# Patient Record
Sex: MALE | URBAN - METROPOLITAN AREA
[De-identification: names, ages, dates, MRNs, and addresses within clinical notes are randomized per-mention and may not be internally consistent; named-entity substitution may affect disease eponyms.]

---

## 2022-03-17 ENCOUNTER — SEE NOTE (OUTPATIENT)
Dept: URBAN - METROPOLITAN AREA CLINIC 36 | Facility: CLINIC | Age: 32
Setting detail: DERMATOLOGY
End: 2022-03-17

## 2022-03-17 ENCOUNTER — RX ONLY (RX ONLY)
Age: 32
End: 2022-03-17

## 2022-03-17 DIAGNOSIS — D22.5 MELANOCYTIC NEVI OF TRUNK: ICD-10-CM

## 2022-03-17 PROCEDURE — 99204 OFFICE O/P NEW MOD 45 MIN: CPT

## 2022-03-17 RX ORDER — FLUCONAZOLE 150 MG/1
1 TABLET TABLET ORAL
Qty: 2 | Refills: 0
Start: 2022-03-17

## 2022-03-17 RX ORDER — KETOCONAZOLE 20 MG/ML
SHAMPOO, SUSPENSION TOPICAL
Qty: 120 | Refills: 5
Start: 2022-03-17

## 2022-04-19 ENCOUNTER — SKIN CANCER EXAM (OUTPATIENT)
Dept: URBAN - METROPOLITAN AREA CLINIC 36 | Facility: CLINIC | Age: 32
Setting detail: DERMATOLOGY
End: 2022-04-19

## 2022-04-19 PROBLEM — D18.01 HEMANGIOMA OF SKIN AND SUBCUTANEOUS TISSUE: Status: ACTIVE | Noted: 2022-04-19

## 2022-04-19 PROBLEM — D18.01 HEMANGIOMA OF SKIN AND SUBCUTANEOUS TISSUE: Status: RESOLVED | Noted: 2022-04-19

## 2022-04-19 PROCEDURE — 99214 OFFICE O/P EST MOD 30 MIN: CPT

## 2024-03-27 ENCOUNTER — APPOINTMENT (OUTPATIENT)
Dept: URBAN - METROPOLITAN AREA CLINIC 206 | Age: 34
Setting detail: DERMATOLOGY
End: 2024-03-29

## 2024-03-27 DIAGNOSIS — L20.89 OTHER ATOPIC DERMATITIS: ICD-10-CM

## 2024-03-27 DIAGNOSIS — L21.8 OTHER SEBORRHEIC DERMATITIS: ICD-10-CM

## 2024-03-27 DIAGNOSIS — H01.13 ECZEMATOUS DERMATITIS OF EYELID: ICD-10-CM

## 2024-03-27 PROBLEM — H01.132 ECZEMATOUS DERMATITIS OF RIGHT LOWER EYELID: Status: ACTIVE | Noted: 2024-03-27

## 2024-03-27 PROCEDURE — OTHER PRESCRIPTION: OTHER

## 2024-03-27 PROCEDURE — OTHER PRESCRIPTION MEDICATION MANAGEMENT: OTHER

## 2024-03-27 PROCEDURE — 99214 OFFICE O/P EST MOD 30 MIN: CPT

## 2024-03-27 PROCEDURE — OTHER COUNSELING: OTHER

## 2024-03-27 PROCEDURE — OTHER PATIENT SPECIFIC COUNSELING: OTHER

## 2024-03-27 PROCEDURE — OTHER MIPS QUALITY: OTHER

## 2024-03-27 RX ORDER — PIMECROLIMUS 10 MG/G
CREAM TOPICAL
Qty: 30 | Refills: 2 | Status: ERX | COMMUNITY
Start: 2024-03-27

## 2024-03-27 RX ORDER — MOMETASONE FUROATE 1 MG/G
CREAM TOPICAL
Qty: 45 | Refills: 2 | Status: ERX | COMMUNITY
Start: 2024-03-27

## 2024-03-27 RX ORDER — CICLOPIROX 10 MG/.96ML
SHAMPOO TOPICAL
Qty: 120 | Refills: 6 | Status: ERX | COMMUNITY
Start: 2024-03-27

## 2024-03-27 ASSESSMENT — BSA RASH: BSA RASH: 2

## 2024-03-27 ASSESSMENT — LOCATION ZONE DERM
LOCATION ZONE: SCALP
LOCATION ZONE: TRUNK
LOCATION ZONE: EYELID

## 2024-03-27 ASSESSMENT — SEVERITY ASSESSMENT 2020: SEVERITY 2020: MODERATE

## 2024-03-27 ASSESSMENT — LOCATION SIMPLE DESCRIPTION DERM
LOCATION SIMPLE: LEFT SCALP
LOCATION SIMPLE: RIGHT INFERIOR EYELID
LOCATION SIMPLE: LOWER BACK

## 2024-03-27 ASSESSMENT — LOCATION DETAILED DESCRIPTION DERM
LOCATION DETAILED: RIGHT LATERAL INFERIOR EYELID
LOCATION DETAILED: LEFT MEDIAL FRONTAL SCALP
LOCATION DETAILED: INFERIOR LUMBAR SPINE

## 2024-03-27 ASSESSMENT — ITCH NUMERIC RATING SCALE: HOW SEVERE IS YOUR ITCHING?: 5

## 2024-03-27 NOTE — PROCEDURE: PRESCRIPTION MEDICATION MANAGEMENT
Render In Strict Bullet Format?: Yes
Initiate Treatment: Elidel 1 % topical cream - Apply to eyelids twice a day for two weeks
Detail Level: Zone
Initiate Treatment: ciclopirox 1 % shampoo - Lather on scalp at least twice a week, let sit for 3-5 minutes, then rinse. Use 2-3 times a week as needed for maintenance.
Plan: RTC in 4 weeks
Initiate Treatment: mometasone 0.1 % topical cream - Apply to eczema on body twice a day x 2 weeks.  Do not use on face, axillae, or groin.\\n\\nAfter 2 weeks, switch to using Elidel cream - apply twice a day until f/u in 4 weeks

## 2024-03-27 NOTE — HPI: RASH
How Severe Is Your Rash?: mild
Is This A New Presentation, Or A Follow-Up?: Rash
How Severe Is Your Rash?: severe
Additional History: patient states itching started around the anus.  He thought it was linked to hygiene at first but the itching continued and started to spread along gluteal cleft.  He has not used any medications. Area is red and very itchy.

## 2024-03-27 NOTE — PROCEDURE: COUNSELING
Detail Level: Zone
Moisturizer Recommendations: CeraVe Moisturizing Lotion/Cream \\nCetaphil Moisturizing Lotion/Cream\\nVanicream Lotion/Cream
Cleanser Recommendations: CeraVe Hydrating Cleanser \\nCetaphil Gentle Cleanser \\nVanicream Cleanser
Detail Level: Detailed
Patient Specific Counseling (Will Not Stick From Patient To Patient): - - - - - \\nEczema vs inverse PSO.  \\nrash at the top of gluteal cleft is lichenified today on exam.
Moisturizer Recommendations: CeraVe moisturizing lotion or cream \\nCetaphil moisturizing lotion or cream \\nVanicream Cream \\nAquaphor or Vaseline in winter months
Antihistamine Recommendations: Antihistamines Recommendations: \\nAllegra 180mg in the morning \\nZyrtec 10mg at night
Detail Level: Simple
Cleanser Recommendations: CeraVe Cleanser \\nCetaphil Cleanser \\nVanicream Cleanser \\nDove sensitive skin body wash or bar